# Patient Record
Sex: FEMALE | Race: WHITE | NOT HISPANIC OR LATINO
[De-identification: names, ages, dates, MRNs, and addresses within clinical notes are randomized per-mention and may not be internally consistent; named-entity substitution may affect disease eponyms.]

---

## 2017-11-07 ENCOUNTER — APPOINTMENT (OUTPATIENT)
Dept: INTERNAL MEDICINE | Facility: CLINIC | Age: 57
End: 2017-11-07
Payer: COMMERCIAL

## 2017-11-07 PROCEDURE — 90686 IIV4 VACC NO PRSV 0.5 ML IM: CPT

## 2017-11-07 PROCEDURE — G0008: CPT

## 2017-11-07 PROCEDURE — 93000 ELECTROCARDIOGRAM COMPLETE: CPT

## 2017-11-07 PROCEDURE — 99396 PREV VISIT EST AGE 40-64: CPT | Mod: 25

## 2017-11-07 PROCEDURE — 94010 BREATHING CAPACITY TEST: CPT

## 2018-02-01 ENCOUNTER — APPOINTMENT (OUTPATIENT)
Dept: INTERNAL MEDICINE | Facility: CLINIC | Age: 58
End: 2018-02-01
Payer: COMMERCIAL

## 2018-02-01 PROCEDURE — 99213 OFFICE O/P EST LOW 20 MIN: CPT

## 2018-12-13 ENCOUNTER — APPOINTMENT (OUTPATIENT)
Dept: INTERNAL MEDICINE | Facility: CLINIC | Age: 58
End: 2018-12-13
Payer: COMMERCIAL

## 2018-12-13 VITALS
HEIGHT: 62 IN | SYSTOLIC BLOOD PRESSURE: 130 MMHG | WEIGHT: 180 LBS | BODY MASS INDEX: 33.13 KG/M2 | DIASTOLIC BLOOD PRESSURE: 80 MMHG

## 2018-12-13 DIAGNOSIS — R06.09 OTHER FORMS OF DYSPNEA: ICD-10-CM

## 2018-12-13 PROCEDURE — 93000 ELECTROCARDIOGRAM COMPLETE: CPT

## 2018-12-13 PROCEDURE — 99396 PREV VISIT EST AGE 40-64: CPT | Mod: 25

## 2018-12-13 NOTE — HISTORY OF PRESENT ILLNESS
[FreeTextEntry1] : Physical  & Medical Evaluation [de-identified] : Patient 4-year-old female here for annual exam overall feeling well she works in [employer] (formerly [employer])  long hours very hard work and overall is feeling well but does have occasional dyspnea on exertion but denies chest pain she had some lab prior to todays visit \par

## 2018-12-13 NOTE — PLAN
[FreeTextEntry1] : Patient 58-year-old female here for annual exam she had lab done prior to coming in and review these with her today she had a low vitamin D which I recommend supplementation and her A1c was   elevated in  prediabetic range which is higher than it was before I recommended that she watch her cards and on a low-carb diet she does complain of occasional dyspnea on exertion and recommend she see a cardiologist she did see Dr. Angelina Sewell some years ago and now recommend she follow up with him to get a better evaluation of this EASON to distinguish whether there is some cardiac issue or just deconditioning EKG today and no change from previous ekg

## 2019-03-27 ENCOUNTER — APPOINTMENT (OUTPATIENT)
Dept: INTERNAL MEDICINE | Facility: CLINIC | Age: 59
End: 2019-03-27
Payer: COMMERCIAL

## 2019-03-27 VITALS
WEIGHT: 182 LBS | SYSTOLIC BLOOD PRESSURE: 125 MMHG | BODY MASS INDEX: 33.49 KG/M2 | DIASTOLIC BLOOD PRESSURE: 75 MMHG | HEIGHT: 62 IN

## 2019-03-27 PROCEDURE — 99214 OFFICE O/P EST MOD 30 MIN: CPT

## 2019-03-28 NOTE — HISTORY OF PRESENT ILLNESS
[FreeTextEntry1] : F/U  on  Chol [de-identified] : Patient 58-year-old female here for FOLLOW UP HS SEEN CADIOLOGY FOR WORK UP OF DYSPNEA IT EMILY TEJADA

## 2019-03-28 NOTE — PLAN
[FreeTextEntry1] : Patient 58-year-old female here for FOLLOW UP HAS ELEVATED CHOL IN PSAT  WILL CHECK LIPID PPOFILE MAY NEED TO GO ON MEDS\par OVERALL STABLE \par GAVE TRX FOR FASTING LABS LIPIS A1C AND VIT D AS IT HAD BEEN LOW IN PAST

## 2019-10-11 ENCOUNTER — OTHER (OUTPATIENT)
Age: 59
End: 2019-10-11

## 2019-12-19 ENCOUNTER — NON-APPOINTMENT (OUTPATIENT)
Age: 59
End: 2019-12-19

## 2019-12-19 ENCOUNTER — APPOINTMENT (OUTPATIENT)
Dept: INTERNAL MEDICINE | Facility: CLINIC | Age: 59
End: 2019-12-19
Payer: MEDICAID

## 2019-12-19 VITALS
HEIGHT: 62 IN | BODY MASS INDEX: 34.41 KG/M2 | WEIGHT: 187 LBS | SYSTOLIC BLOOD PRESSURE: 140 MMHG | DIASTOLIC BLOOD PRESSURE: 80 MMHG

## 2019-12-19 DIAGNOSIS — M85.80 OTHER SPECIFIED DISORDERS OF BONE DENSITY AND STRUCTURE, UNSPECIFIED SITE: ICD-10-CM

## 2019-12-19 DIAGNOSIS — M25.511 PAIN IN RIGHT SHOULDER: ICD-10-CM

## 2019-12-19 PROCEDURE — 93000 ELECTROCARDIOGRAM COMPLETE: CPT

## 2019-12-19 PROCEDURE — 99396 PREV VISIT EST AGE 40-64: CPT | Mod: 25

## 2019-12-19 NOTE — PHYSICAL EXAM
[No Acute Distress] : no acute distress [Well Nourished] : well nourished [Well Developed] : well developed [Well-Appearing] : well-appearing [Normal Sclera/Conjunctiva] : normal sclera/conjunctiva [PERRL] : pupils equal round and reactive to light [Normal Outer Ear/Nose] : the outer ears and nose were normal in appearance [EOMI] : extraocular movements intact [Normal Oropharynx] : the oropharynx was normal [No JVD] : no jugular venous distention [No Lymphadenopathy] : no lymphadenopathy [Supple] : supple [Thyroid Normal, No Nodules] : the thyroid was normal and there were no nodules present [No Respiratory Distress] : no respiratory distress  [No Accessory Muscle Use] : no accessory muscle use [Clear to Auscultation] : lungs were clear to auscultation bilaterally [Normal Rate] : normal rate  [Regular Rhythm] : with a regular rhythm [Normal S1, S2] : normal S1 and S2 [No Murmur] : no murmur heard [No Carotid Bruits] : no carotid bruits [No Abdominal Bruit] : a ~M bruit was not heard ~T in the abdomen [No Varicosities] : no varicosities [Pedal Pulses Present] : the pedal pulses are present [No Edema] : there was no peripheral edema [No Palpable Aorta] : no palpable aorta [No Extremity Clubbing/Cyanosis] : no extremity clubbing/cyanosis [Soft] : abdomen soft [Non Tender] : non-tender [Non-distended] : non-distended [No Masses] : no abdominal mass palpated [No HSM] : no HSM [Normal Bowel Sounds] : normal bowel sounds [Normal Posterior Cervical Nodes] : no posterior cervical lymphadenopathy [Normal Anterior Cervical Nodes] : no anterior cervical lymphadenopathy [No CVA Tenderness] : no CVA  tenderness [No Joint Swelling] : no joint swelling [No Spinal Tenderness] : no spinal tenderness [Grossly Normal Strength/Tone] : grossly normal strength/tone [No Rash] : no rash [Coordination Grossly Intact] : coordination grossly intact [No Focal Deficits] : no focal deficits [Normal Gait] : normal gait [Deep Tendon Reflexes (DTR)] : deep tendon reflexes were 2+ and symmetric [Normal Affect] : the affect was normal [Normal Insight/Judgement] : insight and judgment were intact

## 2019-12-19 NOTE — HISTORY OF PRESENT ILLNESS
[Spouse] : spouse [de-identified] : Patient 59 -year-old female here for  ANNUAL EXAM FEELS [place] HAS RETIRED [employer] AND [employer] [place] LESS ACTIVE AND GAINED WT  COMPLAINS OF RIGHT SHOULDER PAIN\par PT HAD LABS PRIOR TO VISIT  [FreeTextEntry1] : ANNUAL EXAM

## 2019-12-19 NOTE — PLAN
[FreeTextEntry1] : Patient 59 -year-old female here for  ANNUAL EXAM FEELS [place] HAS RETIRED [employer] AND [employer] [place] LESS ACTIVE AND GAINED WT  COMPLAINS OF RIGHT SHOULDER PAIN\par PT HAD LABS PRIOR TO VISIT  AND REVIEWED WITH \par EKG Done HERE NORMAL AND REVIEWED WITH PATIENT\par PT WILL START STATIN CRESTOR 5MG AND RECEHCK\par NEEDS BONE DENSITY AND HAD MAMMOGRAM APPT\par REFER TO ORTHO FOR RIGHT SHOULDER PAIN MAY BE ROTATOR CUFF ISSUE\par WILL FOLLOWUP\par D/WHUSBAND IN THE ROOM

## 2019-12-20 ENCOUNTER — TRANSCRIPTION ENCOUNTER (OUTPATIENT)
Age: 59
End: 2019-12-20

## 2020-01-30 RX ORDER — ROSUVASTATIN CALCIUM 5 MG/1
5 TABLET, FILM COATED ORAL
Qty: 90 | Refills: 3 | Status: DISCONTINUED | COMMUNITY
Start: 2019-12-19 | End: 2020-01-30

## 2020-03-18 ENCOUNTER — APPOINTMENT (OUTPATIENT)
Dept: INTERNAL MEDICINE | Facility: CLINIC | Age: 60
End: 2020-03-18
Payer: MEDICAID

## 2020-03-18 VITALS
WEIGHT: 180 LBS | HEIGHT: 62 IN | BODY MASS INDEX: 33.13 KG/M2 | DIASTOLIC BLOOD PRESSURE: 80 MMHG | SYSTOLIC BLOOD PRESSURE: 120 MMHG

## 2020-03-18 PROCEDURE — 99214 OFFICE O/P EST MOD 30 MIN: CPT

## 2020-03-18 NOTE — HISTORY OF PRESENT ILLNESS
[FreeTextEntry1] : Follow Up on  B/P  &  Other Medical Problems [de-identified] : Patient 59 -year-old female here follow up   HAS RETIRED FROM [employer] AND [employer] AND GAINED WT   RIGHT SHOULDER PAIN  IS BETTER  IS  NOW ON LIPITOR 10MG FEELS [place] AND HAD RECENT BLOOD WORK\par

## 2020-03-18 NOTE — PLAN
[FreeTextEntry1] : Patient 59 -year-old female here for  FOLLOW UP  FEELS OK HAS RETIRED FOR[employer] AND FEEL[employer] [place] LESS ACTIVE AND GAINED WT   F RIGHT SHOULDER PAIN IMPROVED HAS SEEN DR BARRY \par PT HAD LABS PRIOR TO VISIT  AND REVIEWED WITH PT  LIPID MUCH IMPROVED ON 10MG LIPITOR \par  \par D/WHUSBAND IN THE ROOM\par WILL FOLLOW UP IN 6 MONTHS

## 2020-07-20 ENCOUNTER — APPOINTMENT (OUTPATIENT)
Dept: INTERNAL MEDICINE | Facility: CLINIC | Age: 60
End: 2020-07-20

## 2020-09-16 ENCOUNTER — APPOINTMENT (OUTPATIENT)
Dept: INTERNAL MEDICINE | Facility: CLINIC | Age: 60
End: 2020-09-16
Payer: MEDICAID

## 2020-09-16 ENCOUNTER — RX RENEWAL (OUTPATIENT)
Age: 60
End: 2020-09-16

## 2020-09-16 VITALS
WEIGHT: 180 LBS | SYSTOLIC BLOOD PRESSURE: 130 MMHG | DIASTOLIC BLOOD PRESSURE: 70 MMHG | TEMPERATURE: 97.3 F | BODY MASS INDEX: 33.13 KG/M2 | HEIGHT: 62 IN

## 2020-09-16 DIAGNOSIS — E78.5 HYPERLIPIDEMIA, UNSPECIFIED: ICD-10-CM

## 2020-09-16 DIAGNOSIS — Z00.00 ENCOUNTER FOR GENERAL ADULT MEDICAL EXAMINATION W/OUT ABNORMAL FINDINGS: ICD-10-CM

## 2020-09-16 DIAGNOSIS — E55.9 VITAMIN D DEFICIENCY, UNSPECIFIED: ICD-10-CM

## 2020-09-16 DIAGNOSIS — R73.03 PREDIABETES.: ICD-10-CM

## 2020-09-16 PROCEDURE — 99214 OFFICE O/P EST MOD 30 MIN: CPT

## 2020-09-16 RX ORDER — ATORVASTATIN CALCIUM 10 MG/1
10 TABLET, FILM COATED ORAL
Qty: 90 | Refills: 0 | Status: ACTIVE | COMMUNITY
Start: 2020-01-30 | End: 1900-01-01

## 2020-09-16 NOTE — PLAN
[FreeTextEntry1] : Patient 59 -year-old female here for  FOLLOW UP  FEELS OK HAS RETIRED FOR[employer] AND FEELS [place] LESS ACTIVE AND GAINED WT   F RIGHT SHOULDER PAIN IMPROVED HAS SEEN DR BARRY \par PT HAD LABS PRIOR TO VISIT  AND REVIEWED WITH PT  LIPID MUCH IMPROVED ON 10MG LIPITOR \par  \par D/WHUSBAND IN THE ROOM\par ALREADY HAD FLU SHOT\par PT TO MOVE TO [place] GAVE RX FOR LBAS IN CASE SHE STILL WANTS TO FOLLOWUP HERE

## 2020-09-16 NOTE — HISTORY OF PRESENT ILLNESS
[FreeTextEntry1] : followup on hld [de-identified] : Patient 59 -year-old female here follow up   HAS RETIRED FROM [employer] AND [employer] LESS ACT[employer] AND GAINED WT   RIGHT SHOULDER PAIN  IS BETTER \par  IS  NOW ON LIPITOR 10MG FEELS [place] AND HAD RECENT BLOOD WORK\par

## 2020-09-16 NOTE — PHYSICAL EXAM
[Well Nourished] : well nourished [No Acute Distress] : no acute distress [Normal Sclera/Conjunctiva] : normal sclera/conjunctiva [Well-Appearing] : well-appearing [Well Developed] : well developed [EOMI] : extraocular movements intact [Normal Outer Ear/Nose] : the outer ears and nose were normal in appearance [PERRL] : pupils equal round and reactive to light [Normal Oropharynx] : the oropharynx was normal [No JVD] : no jugular venous distention [No Lymphadenopathy] : no lymphadenopathy [No Respiratory Distress] : no respiratory distress  [Supple] : supple [Thyroid Normal, No Nodules] : the thyroid was normal and there were no nodules present [Clear to Auscultation] : lungs were clear to auscultation bilaterally [No Accessory Muscle Use] : no accessory muscle use [Normal Rate] : normal rate  [Regular Rhythm] : with a regular rhythm [Normal S1, S2] : normal S1 and S2 [No Carotid Bruits] : no carotid bruits [No Murmur] : no murmur heard [No Abdominal Bruit] : a ~M bruit was not heard ~T in the abdomen [No Varicosities] : no varicosities [Pedal Pulses Present] : the pedal pulses are present [No Edema] : there was no peripheral edema [No Extremity Clubbing/Cyanosis] : no extremity clubbing/cyanosis [Soft] : abdomen soft [No Palpable Aorta] : no palpable aorta [Non Tender] : non-tender [No Masses] : no abdominal mass palpated [Non-distended] : non-distended [No HSM] : no HSM [Normal Bowel Sounds] : normal bowel sounds [Normal Posterior Cervical Nodes] : no posterior cervical lymphadenopathy [Normal Anterior Cervical Nodes] : no anterior cervical lymphadenopathy [No Joint Swelling] : no joint swelling [No Spinal Tenderness] : no spinal tenderness [No CVA Tenderness] : no CVA  tenderness [Grossly Normal Strength/Tone] : grossly normal strength/tone [No Rash] : no rash [Normal Gait] : normal gait [No Focal Deficits] : no focal deficits [Coordination Grossly Intact] : coordination grossly intact [Normal Affect] : the affect was normal [Normal Insight/Judgement] : insight and judgment were intact [Deep Tendon Reflexes (DTR)] : deep tendon reflexes were 2+ and symmetric

## 2021-05-06 NOTE — REVIEW OF SYSTEMS
[Negative] : Heme/Lymph
PA SUSIE: Patient reassessed, sitting comfortably in chair in NAD, denies any complaints. States feeling better, symptoms improved. CXR clears. Discussed with attending, patient can be discharged home to f/u with PCP. The patient was given verbal and written discharge instructions. Specifically, instructions when to return to the ED and when to seek follow-up from their pcp was discussed. Any specialty follow-up was discussed, including how to make an appointment.  Instructions were discussed in simple, plain language and was understood by the patient. The patient understands that should their symptoms worsen or any new symptoms arise, they should return to the ED immediately for further evaluation. All pt's questions were answered. Patient verbalizes understanding.